# Patient Record
Sex: FEMALE
[De-identification: names, ages, dates, MRNs, and addresses within clinical notes are randomized per-mention and may not be internally consistent; named-entity substitution may affect disease eponyms.]

---

## 2019-06-01 ENCOUNTER — HOSPITAL ENCOUNTER (EMERGENCY)
Dept: HOSPITAL 42 - ED | Age: 29
Discharge: HOME | End: 2019-06-01
Payer: SELF-PAY

## 2019-06-01 VITALS — SYSTOLIC BLOOD PRESSURE: 117 MMHG | HEART RATE: 82 BPM | DIASTOLIC BLOOD PRESSURE: 62 MMHG

## 2019-06-01 VITALS — TEMPERATURE: 98.6 F | RESPIRATION RATE: 18 BRPM | OXYGEN SATURATION: 99 %

## 2019-06-01 DIAGNOSIS — R11.2: ICD-10-CM

## 2019-06-01 DIAGNOSIS — R19.7: ICD-10-CM

## 2019-06-01 DIAGNOSIS — R10.9: ICD-10-CM

## 2019-06-01 DIAGNOSIS — N39.0: Primary | ICD-10-CM

## 2019-06-01 LAB
ALBUMIN SERPL-MCNC: 4.1 G/DL (ref 3–4.8)
ALBUMIN/GLOB SERPL: 1.3 {RATIO} (ref 1.1–1.8)
ALT SERPL-CCNC: 36 U/L (ref 7–56)
APPEARANCE UR: CLEAR
AST SERPL-CCNC: 24 U/L (ref 14–36)
BACTERIA #/AREA URNS HPF: (no result) /HPF
BASOPHILS # BLD AUTO: 0.03 K/MM3 (ref 0–2)
BASOPHILS NFR BLD: 0.2 % (ref 0–3)
BILIRUB UR-MCNC: NEGATIVE MG/DL
BUN SERPL-MCNC: 12 MG/DL (ref 7–21)
CALCIUM SERPL-MCNC: 8.5 MG/DL (ref 8.4–10.5)
COLOR UR: YELLOW
EOSINOPHIL # BLD: 0 10*3/UL (ref 0–0.7)
EOSINOPHIL NFR BLD: 0.3 % (ref 1.5–5)
EPI CELLS #/AREA URNS HPF: (no result) /HPF (ref 0–5)
ERYTHROCYTE [DISTWIDTH] IN BLOOD BY AUTOMATED COUNT: 13.3 % (ref 11.5–14.5)
GFR NON-AFRICAN AMERICAN: > 60
GLUCOSE UR STRIP-MCNC: NEGATIVE MG/DL
HGB BLD-MCNC: 11.7 G/DL (ref 12–16)
LEUKOCYTE ESTERASE UR-ACNC: (no result) LEU/UL
LIPASE SERPL-CCNC: 73 U/L (ref 23–300)
LYMPHOCYTES # BLD: 2 10*3/UL (ref 1.2–3.4)
LYMPHOCYTES NFR BLD AUTO: 16 % (ref 22–35)
MCH RBC QN AUTO: 23.4 PG (ref 25–35)
MCHC RBC AUTO-ENTMCNC: 31.6 G/DL (ref 31–37)
MCV RBC AUTO: 73.9 FL (ref 80–105)
MONOCYTES # BLD AUTO: 1.4 10*3/UL (ref 0.1–0.6)
MONOCYTES NFR BLD: 11.4 % (ref 1–6)
PH UR STRIP: 6 [PH] (ref 4.7–8)
PLATELET # BLD: 389 10^3/UL (ref 120–450)
PMV BLD AUTO: 9.7 FL (ref 7–11)
PROT UR STRIP-MCNC: NEGATIVE MG/DL
RBC # BLD AUTO: 5.01 10^6/UL (ref 3.5–6.1)
RBC # UR STRIP: NEGATIVE /UL
RBC #/AREA URNS HPF: (no result) /HPF (ref 0–2)
SP GR UR STRIP: >= 1.03 (ref 1–1.03)
UROBILINOGEN UR STRIP-ACNC: 0.2 E.U./DL
WBC # BLD AUTO: 12.4 10^3/UL (ref 4.5–11)

## 2019-06-01 PROCEDURE — 85025 COMPLETE CBC W/AUTO DIFF WBC: CPT

## 2019-06-01 PROCEDURE — 83690 ASSAY OF LIPASE: CPT

## 2019-06-01 PROCEDURE — 74177 CT ABD & PELVIS W/CONTRAST: CPT

## 2019-06-01 PROCEDURE — 80053 COMPREHEN METABOLIC PANEL: CPT

## 2019-06-01 PROCEDURE — 81025 URINE PREGNANCY TEST: CPT

## 2019-06-01 PROCEDURE — 96361 HYDRATE IV INFUSION ADD-ON: CPT

## 2019-06-01 PROCEDURE — 87086 URINE CULTURE/COLONY COUNT: CPT

## 2019-06-01 PROCEDURE — 81001 URINALYSIS AUTO W/SCOPE: CPT

## 2019-06-01 PROCEDURE — 99283 EMERGENCY DEPT VISIT LOW MDM: CPT

## 2019-06-01 PROCEDURE — 96374 THER/PROPH/DIAG INJ IV PUSH: CPT

## 2019-06-01 NOTE — ED PDOC
Arrival/HPI





- General


Chief Complaint: Abdominal Pain


Time Seen by Provider: 06/01/19 17:24


Historian: Patient





- History of Present Illness


Narrative History of Present Illness (Text): 





06/01/19 17:41


28-year-old female presents today with a 2-day history of abdominal pain nausea 

vomiting and diarrhea.  Patient states yesterday she had 5 episodes of vomiting 

and 7 or 8 episodes of diarrhea.  Patient states today she had 2 episodes of 

vomiting and about 6 episodes of diarrhea.  Patient is complaining of a lower 

abdominal cramping sensation.  She denies back pain.  No chest pain or shortness

of breath.  Patient denies any sick contacts at home.  Patient denies fevers or 

chills.  No other complaints


Quality: Cramping


Severity Level: Mild





Past Medical History





- Provider Review


Nursing Documentation Reviewed: Yes





- Travel History


Have you recently traveled outside [place] w/in the past 3 mons?: No





- Tetanus Immunization


Tetanus Immunization: Unknown





- Psychiatric


Hx Substance Use: No





- Surgical History


Other/Comment: Liposuction





Family/Social History





- Physician Review


Nursing Documentation Reviewed: Yes


Family/Social History: Unknown Family HX


Smoking Status: Never Smoked


Hx Alcohol Use: No


Hx Substance Use: No





Allergies/Home Meds


Allergies/Adverse Reactions: 


Allergies





No Known Allergies Allergy (Verified 06/01/19 17:28)


   











Review of Systems





- Review of Systems


Constitutional: absent: Fatigue, Fevers


Respiratory: absent: SOB, Cough


Cardiovascular: absent: Chest Pain, Palpitations


Gastrointestinal: Abdominal Pain, Diarrhea, Nausea, Vomiting


Genitourinary Female: absent: Dysuria, Frequency, Hematuria


Musculoskeletal: absent: Arthralgias, Back Pain, Neck Pain


Skin: absent: Rash, Pruritis


Neurological: absent: Headache, Dizziness


Psychiatric: absent: Anxiety, Depression, Suicidal Ideation





Physical Exam


Vital Signs Reviewed: Yes





Vital Signs











  Temp Pulse Resp BP Pulse Ox


 


 06/01/19 17:24  98.6 F  96 H  18  141/88  99











Temperature: Afebrile


Blood Pressure: Normal


Pulse: Regular


Respiratory Rate: Normal


Appearance: Positive for: Well-Appearing, Non-Toxic, Comfortable


Pain Distress: None


Mental Status: Positive for: Alert and Oriented X 3





- Systems Exam


Head: Present: Atraumatic


Mouth: Present: Moist Mucous Membranes


Neck: Present: Normal Range of Motion


Respiratory/Chest: Present: Clear to Auscultation, Good Air Exchange.  No: 

Respiratory Distress, Accessory Muscle Use


Cardiovascular: Present: Regular Rate and Rhythm, Normal S1, S2.  No: Murmurs


Abdomen: Present: Tenderness (+ minimal lower abdominal pain tenderness), Normal

Bowel Sounds.  No: Distention, Peritoneal Signs, Rebound, Guarding


Back: Present: Normal Inspection.  No: CVA Tenderness, Midline Tenderness, 

Paraspinal Tenderness


Upper Extremity: Present: Normal ROM


Lower Extremity: Present: Normal ROM


Neurological: Present: GCS=15, Speech Normal


Skin: Present: Warm, Dry, Normal Color.  No: Rashes


Psychiatric: Present: Alert, Oriented x 3





Medical Decision Making


ED Course and Treatment: 





06/01/19 17:47


Patient is nontoxic well appearing with stable vital signs presenting with lower

abdominal pain, nausea/vomiting, diarrhea.





CBC: wbc; 12.5


CMP: k:3.5


Lipase: wnl





Urinalysis: + trace leukocytes 





CAT scan: PENDING





Patient reassessment: pt is non toxic well appearing; no distress. stable 

vitals. 








Impression: Abdominal pain, UTI, vomiting/diarrhea





06/01/19 19:19


Patient reassessment: Patient denies any complaints at present time. 





Case signed out to LYRIC Delgado pending CAT scan result and reevaluation.





- RAD Interpretation


Radiology Orders: 











06/01/19 17:40


ABD & PELVIS IV CONTRAST ONLY [CT] Stat 














- Medication Orders


Current Medication Orders: 











Sodium Chloride (Sodium Chloride 0.9%)  1,000 mls @ 999 mls/hr IV .Q1H1M STA


   Stop: 06/01/19 18:41


Ondansetron HCl (Zofran Inj)  4 mg IVP STAT STA


   Stop: 06/01/19 17:42











Disposition/Present on Arrival





- Present on Arrival


Any Indicators Present on Arrival: No


History of DVT/PE: No


History of Uncontrolled Diabetes: No


Urinary Catheter: No


History of Decub. Ulcer: No


History Surgical Site Infection Following: None





- Disposition


Have Diagnosis and Disposition been Completed?: Yes


Diagnosis: 


 Abdominal pain, Urinary tract infection, Nausea & vomiting, Diarrhea





Disposition: HOME/ ROUTINE


Disposition Time: 19:20


Patient Plan: Discharge, Other


Condition: STABLE


Discharge Instructions (ExitCare):  Urinary Tract Infections in Adults, Nausea 

and Vomiting, Adult (DC)


Additional Instructions: 


Drink plenty of fluid and follow BRAT diet


Follow up with a GI/Urologist


Return to ED for worsening or persistent symptoms


Prescriptions: 


Cephalexin [cephalexin] 500 mg PO TID #21 cap


Famotidine [Pepcid] 40 mg PO DAILY #10 tab


Ondansetron ODT [Zofran ODT] 4 mg PO Q6 #7 odt


Referrals: 


Abdiel Worley MD [Staff Provider] - Follow up with primary


Sherrell Leyva MD [Medical Doctor] - Follow up with primary


Fer Pretty DO [Staff Provider] - Follow up with primary


Forms:  [employer] (English), WORK NOTE

## 2019-06-01 NOTE — ED PDOC
Physical Exam





Vital Signs











  Temp Pulse Resp BP Pulse Ox


 


 06/01/19 18:54   86  18  115/61  99


 


 06/01/19 17:24  98.6 F  96 H  18  141/88  99














Medical Decision Making


ED Course and Treatment: 


06/01/19 19:52


Case endorsed to me by Anu Pretty. Pending CAT scan results and evaluation.





06/01/2019 19:43


Abd/Pelvis CT


IMPRESSION: 1. Enteritis.  Infectious and inflammatory etiologies are 

considered. Consider consultation with GI service. 


2. Nonspecific subcutaneous infiltration involving the anterior abdominal wall. 


3. Complex right ovarian cyst is seen measuring 17 mm.


Dictator: Raul Fuller M.D.





06/01/19 21:07


On reevaluation pt noted that her pain resolved. She was able to tolerate PO 

fluid in ED.





Her CT result was noted above and was discussed in detail with the patient.





She given Keflex for UTI, pepcid and Zofran for abdominal pain/vomiting.





She was advised to drink plenty of fluid and follow BRAT diet. She was referred 

to a GI/Urologist. Advised to return to the ED for worsening symptoms.





she expressed understanding of the given instructions.





- Lab Interpretations


Lab Results: 





                                        











Total Bilirubin  0.2 mg/dL (0.2-1.3)   06/01/19  17:50    


 


AST  24 U/L (14-36)   06/01/19  17:50    


 


ALT  36 U/L (7-56)   06/01/19  17:50    


 


Alkaline Phosphatase  87 U/L ()   06/01/19  17:50    


 


Total Protein  7.1 g/dL (5.8-8.3)   06/01/19  17:50    


 


Albumin  4.1 g/dL (3.0-4.8)   06/01/19  17:50    


 


Globulin  3.1 gm/dL  06/01/19  17:50    


 


Albumin/Globulin Ratio  1.3  (1.1-1.8)   06/01/19  17:50    








                                        











Lipase  73 U/L ()   06/01/19  17:50    








                                        











Urine Color  Yellow  (YELLOW)   06/01/19  17:50    


 


Urine Appearance  Clear  (CLEAR)   06/01/19  17:50    


 


Urine pH  6.0  (4.7-8.0)   06/01/19  17:50    


 


Ur Specific Gravity  >= 1.030  (1.005-1.035)   06/01/19  17:50    


 


Urine Protein  Negative mg/dL (<30 mg/dL)   06/01/19  17:50    


 


Urine Glucose (UA)  Negative mg/dL (NEGATIVE)   06/01/19  17:50    


 


Urine Ketones  Trace mg/dL (NEGATIVE)  H  06/01/19  17:50    


 


Urine Blood  Negative  (NEGATIVE)   06/01/19  17:50    


 


Urine Nitrate  Negative  (NEGATIVE)   06/01/19  17:50    


 


Urine Bilirubin  Negative  (NEGATIVE)   06/01/19  17:50    


 


Urine Urobilinogen  0.2 E.U./dL (<1 E.U./dL)   06/01/19  17:50    


 


Ur Leukocyte Esterase  Trace Nicolas/uL (NEGATIVE)  H  06/01/19  17:50    


 


Urine RBC  None /hpf (0-2)   06/01/19  17:50    


 


Urine WBC  5 - 10 /hpf (0-6)  H  06/01/19  17:50    


 


Ur Epithelial Cells  Many /hpf (0-5)  H  06/01/19  17:50    


 


Urine Bacteria  Few /hpf (NONE)   06/01/19  17:50    














- RAD Interpretation


Radiology Orders: 











06/01/19 17:40


ABD & PELVIS IV CONTRAST ONLY [CT] Stat 














- Medication Orders


Current Medication Orders: 














Discontinued Medications





Sodium Chloride (Sodium Chloride 0.9%)  1,000 mls @ 999 mls/hr IV .Q1H1M STA


   Stop: 06/01/19 18:41


   Last Admin: 06/01/19 18:14  Dose: 999 mls/hr





eMAR Start Stop


 Document     06/01/19 18:14  [facility]  (Rec: 06/01/19 18:15  [id number]


     Intravenous Solution


      Start Date                                 06/01/19


      Start Time                                 18:15


      End Date                                   06/01/19


      End time                                   19:15


      Total Infusion Time                        60





Ondansetron HCl (Zofran Inj)  4 mg IVP STAT STA


   Stop: 06/01/19 17:42


   Last Admin: 06/01/19 18:14  Dose: 4 mg





IVP Administration


 Document     06/01/19 18:14  [facility]  (Rec: 06/01/19 18:14  [id number]


     Charges for Administration


      # of IVP Administrations                   1














- Scribe Statement


The provider has reviewed the documentation as recorded by the Shefali Lopez





Provider Scribe Attestation:


All medical record entries made by the Scribe were at my direction and 

personally dictated by me. I have reviewed the chart and agree that the record 

accurately reflects my personal performance of the history, physical exam, 

medical decision making, and the department course for this patient. I have also

 personally directed, reviewed, and agree with the discharge instructions and 

disposition.








Disposition/Present on Arrival





- Present on Arrival


Any Indicators Present on Arrival: No


History of DVT/PE: No


History of Uncontrolled Diabetes: No


Urinary Catheter: No


History of Decub. Ulcer: No


History Surgical Site Infection Following: None





- Disposition


Have Diagnosis and Disposition been Completed?: Yes


Diagnosis: 


 Abdominal pain, Urinary tract infection, Nausea & vomiting, Diarrhea





Disposition: HOME/ ROUTINE


Disposition Time: 20:45


Patient Plan: Discharge


Patient Problems: 


                             Current Active Problems











Problem Status Onset


 


Abdominal pain Acute 


 


Diarrhea Acute 


 


Nausea & vomiting Acute 


 


Urinary tract infection Acute 











Condition: STABLE


Discharge Instructions (ExitCare):  Urinary Tract Infections in Adults, Nausea 

and Vomiting, Adult (DC)


Additional Instructions: 


Drink plenty of fluid and follow BRAT diet


Follow up with a GI/Urologist


Return to ED for worsening or persistent symptoms


Prescriptions: 


Cephalexin [cephalexin] 500 mg PO TID #21 cap


Famotidine [Pepcid] 40 mg PO DAILY #10 tab


Ondansetron ODT [Zofran ODT] 4 mg PO Q6 #7 odt


Referrals: 


Sherrell Leyva MD [Medical Doctor] - Follow up with primary


Fer Pretty DO [Staff Provider] - Follow up with primary


Abdiel Worley MD [Staff Provider] - Follow up with primary


Forms:  [employer] (English), WORK NOTE

## 2019-06-02 NOTE — CT
Date of service: 



06/01/2019



PROCEDURE:  CT Abdomen and Pelvis with contrast



HISTORY:

lower abdominal pain, vomiting/diarrhea



COMPARISON:

None.



TECHNIQUE:

Contrast dose: 100 mL Omnipaque 350



Radiation dose:



Total exam DLP = 488.06 mGy-cm.



This CT exam was performed using one or more of the following dose 

reduction techniques: Automated exposure control, adjustment of the 

mA and/or kV according to patient size, and/or use of iterative 

reconstruction technique.



FINDINGS:



LOWER THORAX:

Unremarkable. 



LIVER:

Unremarkable. No gross lesion or ductal dilatation. 



GALLBLADDER AND BILE DUCTS:

Unremarkable. 



PANCREAS:

Unremarkable. No gross lesion or ductal dilatation.



SPLEEN:

Unremarkable. 



ADRENALS:

Unremarkable. No mass. 



KIDNEYS AND URETERS:

Unremarkable. No hydronephrosis. No solid mass. 



VASCULATURE:

Unremarkable. No aortic aneurysm. No aortic atherosclerotic 

calcification or mural plaque present.



BOWEL:

Colonic diverticulosis.  No obstruction. No gross mural thickening. 



APPENDIX:

Normal appendix. 



PERITONEUM:

Nonspecific stranding in the subcutaneous tissues.  No free fluid. No 

free air. 



LYMPH NODES:

Unremarkable. No enlarged lymph nodes. 



BLADDER:

Unremarkable. 



REPRODUCTIVE:

Involuting right corpus luteal follicle.. 



BONES:

No acute fracture. 



OTHER FINDINGS:

None.



IMPRESSION:

No acute abdominal pelvic pathology.



Nonspecific stranding in the subcutaneous abdominal wall soft 

tissues.